# Patient Record
Sex: FEMALE | URBAN - METROPOLITAN AREA
[De-identification: names, ages, dates, MRNs, and addresses within clinical notes are randomized per-mention and may not be internally consistent; named-entity substitution may affect disease eponyms.]

---

## 2019-03-27 ENCOUNTER — OFFICE VISIT (OUTPATIENT)
Dept: ORTHOPEDIC SURGERY | Age: 34
End: 2019-03-27

## 2019-03-27 VITALS
TEMPERATURE: 97.9 F | OXYGEN SATURATION: 96 % | BODY MASS INDEX: 43.16 KG/M2 | WEIGHT: 243.6 LBS | DIASTOLIC BLOOD PRESSURE: 82 MMHG | HEART RATE: 91 BPM | SYSTOLIC BLOOD PRESSURE: 123 MMHG | RESPIRATION RATE: 16 BRPM | HEIGHT: 63 IN

## 2019-03-27 DIAGNOSIS — M96.1 LUMBAR POST-LAMINECTOMY SYNDROME: ICD-10-CM

## 2019-03-27 DIAGNOSIS — M54.16 LUMBAR NEURITIS: Primary | ICD-10-CM

## 2019-03-27 PROBLEM — E66.01 OBESITY, MORBID (HCC): Status: ACTIVE | Noted: 2019-03-27

## 2019-03-27 RX ORDER — GABAPENTIN 300 MG/1
CAPSULE ORAL
Qty: 60 CAP | Refills: 1 | Status: SHIPPED | OUTPATIENT
Start: 2019-03-27

## 2019-03-27 RX ORDER — KETOROLAC TROMETHAMINE 15 MG/ML
60 INJECTION, SOLUTION INTRAMUSCULAR; INTRAVENOUS ONCE
Qty: 1 VIAL | Refills: 0
Start: 2019-03-27 | End: 2019-03-27

## 2019-03-27 RX ORDER — CYCLOBENZAPRINE HCL 10 MG
TABLET ORAL
COMMUNITY

## 2019-03-27 NOTE — PATIENT INSTRUCTIONS
Neuropathic Pain: Care Instructions Your Care Instructions Neuropathic pain is caused by pressure on or damage to your nerves. It's often simply called nerve pain. Some people feel this type of pain all the time. For others, it comes and goes. Diabetes, shingles, or an injury can cause nerve pain. Many people say the pain feels sharp, burning, or stabbing. But some people feel it as a dull ache. In some cases, it makes your skin very sensitive. So touch, pressure, and other sensations that did not hurt before may now cause pain. It's important to know that this kind of pain is real and can affect your quality of life. It's also important to know that treatment can help. Treatment includes pain medicines, exercise, and physical therapy. Medicines can help reduce the number of pain signals that travel over the nerves. This can make the painful areas less sensitive. It can also help you sleep better and improve your mood. But medicines are only one part of successful treatment. Most people do best with more than one kind of treatment. Your doctor may recommend that you try cognitive-behavioral therapy and stress management. Or, if needed, you may decide to try to quit smoking, lower your blood pressure, or better control blood sugar. These kinds of healthy changes can also make a difference. If you feel that your treatment is not working, talk to your doctor. And be sure to tell your doctor if you think you might be depressed or anxious. These are common problems that can also be treated. Follow-up care is a key part of your treatment and safety. Be sure to make and go to all appointments, and call your doctor if you are having problems. It's also a good idea to know your test results and keep a list of the medicines you take. How can you care for yourself at home? · Be safe with medicines. Read and follow all instructions on the label. ? If the doctor gave you a prescription medicine for pain, take it as prescribed. ? If you are not taking a prescription pain medicine, ask your doctor if you can take an over-the-counter medicine. · Save hard tasks for days when you have less pain. Follow a hard task with an easy task. And remember to take breaks. · Relax, and reduce stress. You may want to try deep breathing or meditation. These can help. · Keep moving. Gentle, daily exercise can help reduce pain. Your doctor or physical therapist can tell you what type of exercise is best for you. This may include walking, swimming, and stationary biking. It may also include stretches and range-of-motion exercises. · Try heat, cold packs, and massage. · Get enough sleep. Constant pain can make you more tired. If the pain makes it hard to sleep, talk with your doctor. · Think positively. Your thoughts can affect your pain. Do fun things to distract yourself from the pain. See a movie, read a book, listen to music, or spend time with a friend. · Keep a pain diary. Try to write down how strong your pain is and what it feels like. Also try to notice and write down how your moods, thoughts, sleep, activities, and medicine affect your pain. These notes can help you and your doctor find the best ways to treat your pain. Reducing constipation caused by pain medicine Pain medicines often cause constipation. To reduce constipation: 
· Include fruits, vegetables, beans, and whole grains in your diet each day. These foods are high in fiber. · Drink plenty of fluids, enough so that your urine is light yellow or clear like water. If you have kidney, heart, or liver disease and have to limit fluids, talk with your doctor before you increase the amount of fluids you drink. · Get some exercise every day. Build up slowly to 30 to 60 minutes a day on 5 or more days of the week.  
· Take a fiber supplement, such as Citrucel or Metamucil, every day if needed. Read and follow all instructions on the label. · Schedule time each day for a bowel movement. Having a daily routine may help. Take your time and do not strain when having a bowel movement. · Ask your doctor about a laxative. The goal is to have one easy bowel movement every 1 to 2 days. Do not let constipation go untreated for more than 3 days. When should you call for help? Call your doctor now or seek immediate medical care if: 
  · You feel sad, anxious, or hopeless for more than a few days. This could mean you are depressed. Depression is common in people who have a lot of pain. But it can be treated.  
  · You have trouble with bowel movements, such as: 
? No bowel movement in 3 days. ? Blood in the anal area, in your stool, or on the toilet paper. ? Diarrhea for more than 24 hours.  
 Watch closely for changes in your health, and be sure to contact your doctor if: 
  · Your pain is getting worse.  
  · You can't sleep because of pain.  
  · You are very worried or anxious about your pain.  
  · You have trouble taking your pain medicine.  
  · You have any concerns about your pain medicine or its side effects.  
  · You have vomiting or cramps for more than 2 hours. Where can you learn more? Go to http://joanna-cain.info/. Enter N955 in the search box to learn more about \"Neuropathic Pain: Care Instructions. \" Current as of: Bibiana 3, 2018 Content Version: 11.9 © 1196-0228 [employer]. Care instructions adapted under license by [employer] (which disclaims liability or warranty for this information). If you have questions about a medical condition or this instruction, always ask your healthcare professional. [place] [address] any warranty or liability for your use of this information.

## 2019-03-27 NOTE — PROGRESS NOTES
Verbal order entered per Dr. Vale Awan as documented on blue sheet:Gabapentin 300mg take 1 tab po QHS x 1 week, then increase to 1 tab po BID. Disp 60 with 1 refill. Toradol 60mg IM x 1 now

## 2019-03-27 NOTE — PROGRESS NOTES
[address] 
[address], [address] Phone: [phone] Fax: [phone] Oriana Moore : 1985 PCP: Saundra Leggett NP 
 
 
NEW PATIENT 
 
 
ASSESSMENT AND PLAN Diagnoses and all orders for this visit: 1. Lumbar neuritis 2. Lumbar post-laminectomy syndrome 
-     KETOROLAC TROMETHAMINE INJ 
-     WA THER/PROPH/DIAG INJECTION, SUBCUT/IM Other orders 
-     ketorolac (TORADOL) 15 mg/mL soln injection; 4 mL by IntraMUSCular route once for 1 dose. 
-     gabapentin (NEURONTIN) 300 mg capsule; Take 1 tab po QHS for 1 week, then increase to 1 tab po BID 1. Advised to stay active as tolerated. paced walking ok. 2. Trial of Gabapentin 3. Toradol injection 4. May take Flexeril 5mg PRN 5. Avoid repetitive bending, lifting, and twisting 6. Given information on neuropathic pain F/U 4 weeks CHIEF COMPLAINT Shannen Liz is seen today in consultation at the request of Dr. Leilani Torres for complaints of L buttock and leg pain. HISTORY OF PRESENT ILLNESS Shannen Liz is a 34 y.o. female, hx lami '16. Today pt c/o L buttock and leg pain of 6 month duration. Pt reports having a  with infection 6 months ago and noticed pain afterwards. Pt notes she had an HNP with LLE pain prior to her lami, which resolved LLE. She notes she had pain in low back after surgery and during pregnancy. She denies sciatic issues during pregnancy. Pt states she had 3 surgeries after C section to deal with infection. Pt reports increasing L buttock and leg pain since then. She c/o increased numbness in LLE. MRI reviewed, enhancing fibrosis at site of prior sx. Location of pain: low back Does pain radiate into extremities: L buttock and leg pain and numbness L5-S1 Does patient have weakness: no  
Pt denies saddle paresthesias. Pt reports constipation since giving birth, benefit with probiotic. Medications pt is on: Flexeril 10mg PRN. Denies persistent fevers, chills, weight changes, neurogenic bowel or bladder symptoms. Pt denies any recent GI ulcers, bleeds or renal dysfucntion. Treatments patient has tried: 
Physical therapy:Yes Doing HEP: Yes Inversion and ball exercises Non-opioid medications: Yes Failed TENS Spinal injections: No 
Spinal surgery- Yes. laminectomy 2016 with benefit Last L MRI 2019: scar tissue L4-5, L3-4, S1 nerve root  reviewed. Pt was previously a runner. Fam hx of back issues. Pt has a 11 month old son, who sleeps through the night. No flowsheet data found. MRI lumbar spine 1/31/19 IMPRESSION: 
1. There is isointense material at the posterior aspect of the L4-5 disc which appears to enhance and in most consistent with enhancing scar tissue or inflammatory tissue. Similar but less prominent appearance at the L3-4 level is also likely scar. 2. Enhancing material surrounds the descending left S1 nerve root in the subarticular recess and extends along the apparent surgical approach, most consistent with scar. There does appear to be a small amount of disc material and osteophytosis in the bilateral neural foramen causing mild narrowing. PAST MEDICAL HISTORY Past Medical History:  
Diagnosis Date  Ill-defined condition Spinal Stenosis Past Surgical History:  
Procedure Laterality Date  HX GYN    
 HX LUMBAR LAMINECTOMY  04/13/2016  
 [facility]  
 
 
MEDICATIONS Current Outpatient Medications Medication Sig Dispense Refill  cyclobenzaprine (FLEXERIL) 10 mg tablet Take  by mouth three (3) times daily as needed for Muscle Spasm(s).  gabapentin (NEURONTIN) 300 mg capsule Take 1 tab po QHS for 1 week, then increase to 1 tab po BID 60 Cap 1 ALLERGIES Allergies Allergen Reactions  Penicillins Swelling SOCIAL HISTORY Social History Socioeconomic History  Marital status:   
 Spouse name: Not on file  Number of children: Not on file  Years of education: Not on file  Highest education level: Not on file Occupational History  Not on file Social Needs  Financial resource strain: Not on file  Food insecurity:  
  Worry: Not on file Inability: Not on file  Transportation needs:  
  Medical: Not on file Non-medical: Not on file Tobacco Use  Smoking status: Former Smoker  Smokeless tobacco: Former User Substance and Sexual Activity  Alcohol use: Yes Alcohol/week: 1.2 oz Types: 2 Glasses of wine per week  Drug use: Not on file  Sexual activity: Not on file Lifestyle  Physical activity:  
  Days per week: Not on file Minutes per session: Not on file  Stress: Not on file Relationships  Social connections:  
  Talks on phone: Not on file Gets together: Not on file Attends Nondenominational service: Not on file Active member of club or organization: Not on file Attends meetings of clubs or organizations: Not on file Relationship status: Not on file  Intimate partner violence:  
  Fear of current or ex partner: Not on file Emotionally abused: Not on file Physically abused: Not on file Forced sexual activity: Not on file Other Topics Concern [address] Service Not Asked  Blood Transfusions Not Asked  Caffeine Concern Not Asked  Occupational Exposure Not Asked [place] Hazards Not Asked  Sleep Concern Not Asked  Stress Concern Not Asked  Weight Concern Not Asked  Special Diet Not Asked  Back Care Not Asked  Exercise Not Asked  Bike Helmet Not Asked  Seat Belt Not Asked  Self-Exams Not Asked Social History Narrative  Not on file FAMILY HISTORY Family History Problem Relation Age of Onset  Hypertension Father REVIEW OF SYSTEMS Review of Systems Constitutional: Negative for chills, fever and weight loss. Respiratory: Negative for shortness of breath. Cardiovascular: Negative for chest pain. Gastrointestinal: Positive for constipation. Negative for fecal incontinence Genitourinary: Negative for dysuria. Negative for urinary incontinence Musculoskeletal:  
     Per HPI Skin: Negative for rash. Neurological: Positive for tingling and focal weakness. Negative for dizziness, tremors and headaches. Endo/Heme/Allergies: Does not bruise/bleed easily. Psychiatric/Behavioral: The patient does not have insomnia. PHYSICAL EXAMINATION Visit Vitals /82 Pulse 91 Temp 97.9 °F (36.6 °C) (Oral) Resp 16 Ht 5' 3\" (1.6 m) Wt 243 lb 9.6 oz (110.5 kg) SpO2 96% BMI 43.15 kg/m² Accompanied by family member. Constitutional:  Well developed, well nourished, in no acute distress. Psychiatric: Affect and mood are appropriate. Integumentary: No rashes or abrasions noted on exposed areas. Cardiovascular/Peripheral Vascular: Intact l pulses. No peripheral edema is noted BLE. Lymphatic:  No evidence of lymphedema. No cervical lymphadenopathy. SPINE/MUSCULOSKELETAL EXAM 
 
 
Lumbar spine: No rash, ecchymosis, or gross obliquity. Mild elevation L pelvis compared to R. No fasciculations. No focal atrophy is noted. Mild Tenderness to palpation L SI joint. No tenderness to palpation at the sciatic notch. Trochanters non tender. MOTOR:   
 
 Hip Flex  Quads Hamstrings Ankle DF EHL Ankle PF Right +4/5 +4/5 +4/5 +4/5 +4/5 +4/5 Left +4/5 +4/5 +4/5 +4/5 +4/5 +4/5 Mild eversion weakness on L. Straight Leg raise negative. Ambulation without assistive device. FWB. Written by Roseanna Gruber, as dictated by Weyman Mohs, MD. 
 
I, Dr. Weyman Mohs, MD, confirm that all documentation is accurate.  
 
 
Ms. German De Leon may have a reminder for a \"due or due soon\" health maintenance. I have asked that she contact her primary care provider for follow-up on this health maintenance.